# Patient Record
Sex: FEMALE | Race: BLACK OR AFRICAN AMERICAN | URBAN - METROPOLITAN AREA
[De-identification: names, ages, dates, MRNs, and addresses within clinical notes are randomized per-mention and may not be internally consistent; named-entity substitution may affect disease eponyms.]

---

## 2017-06-14 ENCOUNTER — OFFICE VISIT (OUTPATIENT)
Dept: NEUROLOGY | Age: 82
End: 2017-06-14

## 2017-06-14 VITALS
DIASTOLIC BLOOD PRESSURE: 66 MMHG | SYSTOLIC BLOOD PRESSURE: 146 MMHG | WEIGHT: 124 LBS | RESPIRATION RATE: 20 BRPM | OXYGEN SATURATION: 95 % | HEART RATE: 67 BPM | BODY MASS INDEX: 24.35 KG/M2 | HEIGHT: 60 IN

## 2017-06-14 DIAGNOSIS — G20 PARKINSON'S DISEASE (HCC): Primary | ICD-10-CM

## 2017-06-14 NOTE — PROGRESS NOTES
Parkinson's disease- she has been fine since she was here last, has been about the same except her feet are swelling more   Nothing new to report

## 2017-06-14 NOTE — PROGRESS NOTES
Date:  17     Name:  Sulaiman Najera  :  1925  MRN:  [id number]     PCP:  Elvie Pelayo MD    Chief Complaint   Patient presents with    Parkinsons Disease     HISTORY OF PRESENT ILLNESS: Follow up evaluation for Parkinson disease. Continues to take Sinemet 25/100 three times a day. She does continue to have some tremor but this is not significant nor does not get in her way. It is primarily a rest tremor. She does have some mild balance issues but has not had any falls. She denies having any issues with wearing off or freezing. No hallucinations or delusions. No difficulty with sleeping, depression, or anxiety. No significant rigidity. She does state that she has some slowness but this is probably no worse than any other 80year-old. She denies having any significant memory issues. No difficulty swallowing. Still independent with ADL    Except as noted above, denies  fever, chills, cough. No CP or SOB. No dysuria, loss of bowel or bladder control. No Weight loss. Appetite good. Sleeping well. No sweats. No edema. No bruising or bleeding. No nausea or vomit. No diarrhea. No frequency, urgency, No depressive sxs. No anxiety. Denies sore throat, nasal congestion, nasal discharge, epistaxis, tinnitus, hearing loss, back pain, muscle pain, or joint pain.        Current Outpatient Prescriptions   Medication Sig    ketorolac (ACULAR) 0.5 % ophthalmic solution INSTILL 1 DROP 4 TIMES A DAY INTO EYE HAVING SURGERY, START 2 DAYS PRIOR TO SURGERY    ofloxacin (FLOXIN) 0.3 % ophthalmic solution INSTILL 1 DROP 4 TIMES EVERY DAY INTO SURGERY EYE, START 2 DAYS PRIOR TO SURGERY    prednisoLONE acetate (PRED FORTE) 1 % ophthalmic suspension INSTILL 1 DROP 4 TIMES A DAY INTO EYE AFTER SURGERY FOR 1 WEEK THEN TAPER AS DIRECTED    carbidopa-levodopa (SINEMET)  mg per tablet TAKE 1 TABLET BY MOUTH 3 TIMES A DAY 10AM,2PM AND 6PM    POTASSIUM CHLORIDE (KLOR-CON PO) Take  by mouth.    TURMERIC ROOT EXTRACT PO Take  by mouth.  hydrochlorothiazide (HYDRODIURIL) 25 mg tablet Take 25 mg by mouth daily.  enalapril (VASOTEC) 5 mg tablet Take  by mouth daily. No current facility-administered medications for this visit. Allergies   Allergen Reactions    Shellfish Derived Nausea and Vomiting    Sulfa (Sulfonamide Antibiotics) Nausea and Vomiting     Past Medical History:   Diagnosis Date    Bell's palsy 1997    Diabetes ([address]     Heart disease     mumur    High blood pressure     Paralysis agitans (HCC)      Past Surgical History:   Procedure Laterality Date    ABDOMEN SURGERY PROC UNLISTED      carterized colon due to bleed    HX OTHER SURGICAL  Nov and Dec 2016    cataract removed in both eyes      Social History     Social History    Marital status:      Spouse name: N/A    Number of children: N/A    Years of education: N/A     Occupational History    Not on file. Social History Main Topics    Smoking status: Never Smoker    Smokeless tobacco: Never Used    Alcohol use No    Drug use: Not on file    Sexual activity: Not on file     Other Topics Concern    Not on file     Social History Narrative     Family History   Problem Relation Age of Onset    Parkinsonism Sister     Parkinsonism Brother        PHYSICAL EXAMINATION:    Visit Vitals    /66    Pulse 67    Resp 20    Ht 5' (1.524 m)    Wt 56.2 kg (124 lb)    SpO2 95%    BMI 24.22 kg/m2     General: Well defined, nourished, and groomed individual in no acute distress.    Neck: Supple, nontender, no bruits, no pain with resistance to active range of motion.    Heart: Regular rate and rhythm, positive heart murmurs, no rub or gallop. Normal S1S2.    Lungs: Clear to auscultation bilaterally with equal chest expansion, no cough, no wheeze    Musculoskeletal: Extremities revealed no edema and had full range of motion of joints.    Psych: Pleasant and engaging with a mask like face.    NEUROLOGICAL EXAMINATION:    Mental Status: Alert and oriented to person, place, and time with recent and remote memory intact. Attention span and concentration are normal. Speech is fluent with a full fund of knowledge.    Cranial Nerves:    II, III, IV, VI: Visual acuity grossly intact. Visual fields are normal.    Pupils are equal, round, and reactive to light and accommodation.    Extra-ocular movements are full and fluid. Fundoscopic exam was benign, no ptosis or nystagmus.    V-XII: Hearing is grossly intact. There is left facial asymmetry secondary to Bell's Palsy. The palate rises symmetrically and the tongue protrudes midline. Sternocleidomastoids 5/5.    Motor Examination: Age related loss of tone and bulk. Strength testing with minimal generalized weakness. Minimal cogwheel rigidity of the left wrist    Coordination: Finger to nose and rapid arm movement testing demonstrate bradykinesia and mild dysmetria. No intention tremor. Left rest tremor and chin tremor both of which are better than on previous exam    Gait and Station: Mildly unsteady with a slow gait and decreased arm swing. Attenuation of the left hand rest tremor. No pronator drift. No muscle wasting or fasiculations noted.    Reflexes: DTRs 2+ throughout. ASSESSMENT AND PLAN    ICD-10-CM ICD-9-CM    1. Parkinson's disease ([facility] G20 332.0      Parkinson's disease is stable. Symptoms are not significantly bothersome. Will continue with present dose of Sinemet 3 times daily. Follow-up in 6 months or sooner if needed. Danielle Díaz

## 2017-06-14 NOTE — PATIENT INSTRUCTIONS

## 2017-06-14 NOTE — MR AVS SNAPSHOT
Visit Information Date & Time Provider Department Dept. Phone Encounter #  
 6/14/2017 11:30 AM Joshua Tuttle NP [facility] Neurology [facility] [phone] [id number] Follow-up Instructions Return in about 6 months (around 12/14/2017). Upcoming Health Maintenance Date Due DTaP/Tdap/Td series (1 - Tdap) 8/28/1946 ZOSTER VACCINE AGE 60> 8/28/1985 GLAUCOMA SCREENING Q2Y 8/28/1990 OSTEOPOROSIS SCREENING (DEXA) 8/28/1990 Pneumococcal 65+ Low/Medium Risk (1 of 2 - PCV13) 8/28/1990 MEDICARE YEARLY EXAM 8/28/1990 INFLUENZA AGE 9 TO ADULT 8/1/2017 Allergies as of 6/14/2017  Review Complete On: 6/14/2017 By: Joshua Tuttle NP Severity Noted Reaction Type Reactions Shellfish Derived  12/02/2015   Systemic Nausea and Vomiting  
 Sulfa (Sulfonamide Antibiotics)  01/15/2015    Nausea and Vomiting Current Immunizations  Never Reviewed No immunizations on file. Not reviewed this visit You Were Diagnosed With   
  
 Codes Comments [facility]'s [facility] ([facility]    -  Primary ICD-10-CM: G20 
ICD-9-CM: 332.0 Vitals BP Pulse Resp Height(growth percentile) Weight(growth percentile) SpO2  
 146/66 67 20 5' (1.524 m) 124 lb (56.2 kg) 95% BMI OB Status Smoking Status 24.22 kg/m2 Postmenopausal Never Smoker Vitals History BMI and BSA Data Body Mass Index Body Surface Area  
 24.22 kg/m 2 1.54 m 2 Preferred Pharmacy Pharmacy Name Phone [id number] - [place], [place] - [address] [phone] Your Updated Medication List  
  
   
This list is accurate as of: 6/14/17 11:51 AM.  Always use your most recent med list.  
  
  
  
  
 carbidopa-levodopa  mg per tablet Commonly known as:  SINEMET  
TAKE 1 TABLET BY MOUTH 3 TIMES A DAY 10AM,2PM AND 6PM  
  
 enalapril 5 mg tablet Commonly known as:  Angela Sanchez Take  by mouth daily. hydroCHLOROthiazide 25 mg tablet Commonly known as:  HYDRODIURIL Take 25 mg by mouth daily. ketorolac 0.5 % ophthalmic solution Commonly known as:  Shade Saucedo INSTILL 1 DROP 4 TIMES A DAY INTO EYE HAVING SURGERY, START 2 DAYS PRIOR TO SURGERY  
  
 KLOR-CON PO Take  by mouth. ofloxacin 0.3 % ophthalmic solution Commonly known as:  FLOXIN  
INSTILL 1 DROP 4 TIMES EVERY DAY INTO SURGERY EYE, START 2 DAYS PRIOR TO SURGERY  
  
 prednisoLONE acetate 1 % ophthalmic suspension Commonly known as:  PRED FORTE INSTILL 1 DROP 4 TIMES A DAY INTO EYE AFTER SURGERY FOR 1 WEEK THEN TAPER AS DIRECTED  
  
 TURMERIC ROOT EXTRACT PO Take  by mouth. Follow-up Instructions Return in about 6 months (around 12/14/2017). Patient Instructions A Healthy Lifestyle: Care Instructions Your Care Instructions A healthy lifestyle can help you feel good, stay at a healthy weight, and have plenty of energy for both work and play. A healthy lifestyle is something you can share with your whole family. A healthy lifestyle also can lower your risk for serious health problems, such as high blood pressure, heart disease, and diabetes. You can follow a few steps listed below to improve your health and the health of your family. Follow-up care is a key part of your treatment and safety. Be sure to make and go to all appointments, and call your doctor if you are having problems. Its also a good idea to know your test results and keep a list of the medicines you take. How can you care for yourself at home? · Do not eat too much sugar, fat, or fast foods. You can still have dessert and treats now and then. The goal is moderation. · Start small to improve your eating habits. Pay attention to portion sizes, drink less juice and soda pop, and eat more fruits and vegetables. ¨ Eat a healthy amount of food.  A 3-ounce serving of meat, for example, is about the size of a deck of cards. Fill the rest of your plate with vegetables and whole grains. ¨ Limit the amount of soda and sports drinks you have every day. Drink more water when you are thirsty. ¨ Eat at least 5 servings of fruits and vegetables every day. It may seem like a lot, but it is not hard to reach this goal. A serving or helping is 1 piece of fruit, 1 cup of vegetables, or 2 cups of leafy, raw vegetables. Have an apple or some carrot sticks as an afternoon snack instead of a candy bar. Try to have fruits and/or vegetables at every meal. 
· Make exercise part of your daily routine. You may want to start with simple activities, such as walking, bicycling, or slow swimming. Try to be active 30 to 60 minutes every day. You do not need to do all 30 to 60 minutes all at once. For example, you can exercise 3 times a day for 10 or 20 minutes. Moderate exercise is safe for most people, but it is always a good idea to talk to your doctor before starting an exercise program. 
· Keep moving. Gabriela Gant the lawn, work in the garden, or [employer]. Take the stairs instead of the elevator at work. · If you smoke, quit. People who smoke have an increased risk for heart attack, stroke, cancer, and other lung illnesses. Quitting is hard, but there are ways to boost your chance of quitting tobacco for good. ¨ Use nicotine gum, patches, or lozenges. ¨ Ask your doctor about stop-smoking programs and medicines. ¨ Keep trying. In addition to reducing your risk of diseases in the future, you will notice some benefits soon after you stop using tobacco. If you have shortness of breath or asthma symptoms, they will likely get better within a few weeks after you quit. · Limit how much alcohol you drink. Moderate amounts of alcohol (up to 2 drinks a day for men, 1 drink a day for women) are okay. But drinking too much can lead to liver problems, high blood pressure, and other health problems. Family health If you have a family, there are many things you can do together to improve your health. · Eat meals together as a family as often as possible. · Eat healthy foods. This includes fruits, vegetables, lean meats and dairy, and whole grains. · Include your family in your fitness plan. Most people think of activities such as jogging or tennis as the way to fitness, but there are many ways you and your family can be more active. Anything that makes you breathe hard and gets your heart pumping is exercise. Here are some tips: 
¨ Walk to do errands or to take your child to school or the bus. ¨ Go for a family bike ride after dinner instead of watching TV. Where can you learn more? Go to http://irma-asya.info/. Enter J028 in the search box to learn more about \"A Healthy Lifestyle: Care Instructions. \" Current as of: July 26, 2016 Content Version: 11.2 © 5284-3018 [employer]. Care instructions adapted under license by [employer] (which disclaims liability or warranty for this information). If you have questions about a medical condition or this instruction, always ask your healthcare professional. [place] [address] any warranty or liability for your use of this information. Introducing \[facility] [place] [facility]\"" & HEALTH SERVICES! Martine Young introduces [employer] patient portal. Now you can access parts of your medical record, email your doctor's office, and request medication refills online. 1. In your internet browser, go to [email or web address] 2. Click on the First Time User? Click Here link in the Sign In box. You will see the New Member Sign Up page. 3. Enter your [employer] Access Code exactly as it appears below. You will not need to use this code after youve completed the sign-up process. If you do not sign up before the expiration date, you must request a new code. · [employer] Access Code: [email or web address] Expires: 9/12/2017 11:51 AM 
 
 4. Enter the last four digits of your Social Security Number (xxxx) and Date of Birth (mm/dd/yyyy) as indicated and click Submit. You will be taken to the next sign-up page. 5. Create a [employer] ID. This will be your [employer] login ID and cannot be changed, so think of one that is secure and easy to remember. 6. Create a [employer] password. You can change your password at any time. 7. Enter your Password Reset Question and Answer. This can be used at a later time if you forget your password. 8. Enter your e-mail address. You will receive e-mail notification when new information is available in [address] 9. Click Sign Up. You can now view and download portions of your medical record. 10. Click the Download Summary menu link to download a portable copy of your medical information. If you have questions, please visit the Frequently Asked Questions section of the [employer] website. Remember, [employer] is NOT to be used for urgent needs. For medical emergencies, dial 911. Now available from your iPhone and Android! Please provide this summary of care documentation to your next provider. Your primary care clinician is listed as Bel Villatoro. If you have any questions after today's visit, please call [phone].

## 2017-06-15 DIAGNOSIS — G20 PARKINSON'S DISEASE (HCC): ICD-10-CM

## 2017-06-16 RX ORDER — CARBIDOPA AND LEVODOPA 25; 100 MG/1; MG/1
TABLET ORAL
Qty: 90 TAB | Refills: 3 | Status: SHIPPED | OUTPATIENT
Start: 2017-06-16 | End: 2017-11-06 | Stop reason: SDUPTHER

## 2017-11-06 DIAGNOSIS — G20 PARKINSON'S DISEASE (HCC): ICD-10-CM

## 2017-11-06 RX ORDER — CARBIDOPA AND LEVODOPA 25; 100 MG/1; MG/1
TABLET ORAL
Qty: 90 TAB | Refills: 3 | Status: SHIPPED | OUTPATIENT
Start: 2017-11-06 | End: 2018-01-10 | Stop reason: SDUPTHER

## 2018-01-10 ENCOUNTER — OFFICE VISIT (OUTPATIENT)
Dept: NEUROLOGY | Age: 83
End: 2018-01-10

## 2018-01-10 VITALS
BODY MASS INDEX: 25.13 KG/M2 | WEIGHT: 128 LBS | RESPIRATION RATE: 20 BRPM | DIASTOLIC BLOOD PRESSURE: 80 MMHG | OXYGEN SATURATION: 98 % | HEART RATE: 86 BPM | SYSTOLIC BLOOD PRESSURE: 154 MMHG | HEIGHT: 60 IN

## 2018-01-10 DIAGNOSIS — G20 PARKINSON'S DISEASE (HCC): Primary | ICD-10-CM

## 2018-01-10 RX ORDER — CARBIDOPA AND LEVODOPA 25; 100 MG/1; MG/1
TABLET ORAL
Qty: 90 TAB | Refills: 3 | Status: SHIPPED | OUTPATIENT
Start: 2018-01-10 | End: 2018-07-11 | Stop reason: SDUPTHER

## 2018-01-10 NOTE — PATIENT INSTRUCTIONS

## 2018-01-10 NOTE — PROGRESS NOTES
Date:  01/10/18     Name:  Christiano Lr  :  1925  MRN:  [id number]     PCP:  Paulette Schaffer MD    Chief Complaint   Patient presents with    Tremors     parkinson's disease     HISTORY OF PRESENT ILLNESS: Follow up for Parkinson's Disease. She is accompanied by her daughter. She continues to take Sinemet three times a day. Continue with left hand tremor but not bothersome and not interfering with daily activities. Still has some slowness of movement which is really no different than it has been. She did have one fall in the house a couple of months ago because tripped over an extension cord. No significant stiffness or rigidity. Denies having any delusion, hallucinations, anxiety, depression. No issues with swallowing, sleeping. No changes in memory. Denies changes in bowel or bladder. Appetite is good. Independent with ADL. No freezing or wearing off. No changes in hearing or vision. Current Outpatient Prescriptions   Medication Sig    carbidopa-levodopa (SINEMET)  mg per tablet TAKE ONE TABLET BY MOUTH 3 TIMES A DAY (10AM, 2PM, AND 6PM)    ketorolac (ACULAR) 0.5 % ophthalmic solution INSTILL 1 DROP 4 TIMES A DAY INTO EYE HAVING SURGERY, START 2 DAYS PRIOR TO SURGERY    ofloxacin (FLOXIN) 0.3 % ophthalmic solution INSTILL 1 DROP 4 TIMES EVERY DAY INTO SURGERY EYE, START 2 DAYS PRIOR TO SURGERY    prednisoLONE acetate (PRED FORTE) 1 % ophthalmic suspension INSTILL 1 DROP 4 TIMES A DAY INTO EYE AFTER SURGERY FOR 1 WEEK THEN TAPER AS DIRECTED    POTASSIUM CHLORIDE (KLOR-CON PO) Take  by mouth.  TURMERIC ROOT EXTRACT PO Take  by mouth.  hydrochlorothiazide (HYDRODIURIL) 25 mg tablet Take 25 mg by mouth daily.  enalapril (VASOTEC) 5 mg tablet Take  by mouth daily. No current facility-administered medications for this visit.       Allergies   Allergen Reactions    Shellfish Derived Nausea and Vomiting    Sulfa (Sulfonamide Antibiotics) Nausea and Vomiting     Past Medical History:   Diagnosis Date    Bell's palsy 1997    [address] [facility] [address]     Heart disease     mumur    High blood pressure     Paralysis agitans ([facility]      Past Surgical History:   Procedure Laterality Date    ABDOMEN SURGERY PROC UNLISTED      carterized colon due to bleed    HX OTHER SURGICAL  Nov and Dec 2016    cataract removed in both eyes      Social History     Social History    Marital status:      Spouse name: N/A    Number of children: N/A    Years of education: N/A     Occupational History    Not on file. Social History Main Topics    Smoking status: Never Smoker    Smokeless tobacco: Never Used    Alcohol use No    Drug use: Not on file    Sexual activity: Not on file     Other Topics Concern    Not on file     Social History Narrative     Family History   Problem Relation Age of Onset    Parkinsonism Sister     Parkinsonism Brother        PHYSICAL EXAMINATION:    Visit Vitals    /80    Pulse 86    Resp 20    Ht 5' (1.524 m)    Wt 58.1 kg (128 lb)    SpO2 98%    BMI 25 kg/m2     General: Well defined, nourished, and groomed individual in no acute distress.    Neck: Supple, nontender, no bruits, no pain with resistance to active range of motion.    Heart: Regular rate and rhythm, positive heart murmurs, no rub or gallop. Normal S1S2.    Lungs: Clear to auscultation bilaterally with equal chest expansion, no cough, no wheeze    Musculoskeletal: Extremities revealed had full range of motion of joints. There is some mild edema in the lower extremities bilaterally, left greater than right  Psych: Pleasant and engaging with a mask like face.    NEUROLOGICAL EXAMINATION:    Mental Status: Alert and oriented to person, place, and time with recent and remote memory intact. Attention span and concentration are normal. Speech is fluent with a full fund of knowledge.    Cranial Nerves:    II, III, IV, VI: Visual acuity grossly intact.  Visual fields are normal.    Pupils are equal, round, and reactive to light and accommodation.    Extra-ocular movements are full and fluid. Fundoscopic exam was benign, no ptosis or nystagmus.    V-XII: Hearing is grossly intact. There is left facial asymmetry secondary to Bell's Palsy. The palate rises symmetrically and the tongue protrudes midline. Sternocleidomastoids 5/5.    Motor Examination: Age related loss of tone and bulk. Strength testing with minimal generalized weakness. Minimal cogwheel rigidity of the left wrist    Coordination: Finger to nose and rapid arm movement testing demonstrate bradykinesia and mild dysmetria. No intention tremor. Left rest tremor, no chin tremor    Gait and Station: Mildly unsteady with a slow gait and decreased arm swing. Uses a cane for balance. Attenuation of the left hand rest tremor. No pronator drift. No muscle wasting or fasiculations noted.    Reflexes: DTRs 2+ throughout.      ASSESSMENT AND PLAN    ICD-10-CM ICD-9-CM    1. [facility] G20 332.0 carbidopa-levodopa (SINEMET)  mg per tablet     Stable Parkinson Disease on present dose of Sinemet. Continue with the same tid. Discussed non-motor symptoms of Parkinson's Disease today as they had questions about Northera because they had seen the commercials. Follow up in six months    [facility].  Alicia Grant

## 2018-01-10 NOTE — MR AVS SNAPSHOT
Visit Information Date & Time Provider Department Dept. Phone Encounter #  
 1/10/2018  9:30 AM RM [facility] [phone] [id number] Follow-up Instructions Return in about 6 months (around 7/10/2018). Upcoming Health Maintenance Date Due DTaP/Tdap/Td series (1 - Tdap) 8/28/1946 ZOSTER VACCINE AGE 60> 6/28/1985 GLAUCOMA SCREENING Q2Y 8/28/1990 OSTEOPOROSIS SCREENING (DEXA) 8/28/1990 Pneumococcal 65+ Low/Medium Risk (1 of 2 - PCV13) 8/28/1990 MEDICARE YEARLY EXAM 8/28/1990 Influenza Age 5 to Adult 8/1/2017 Allergies as of 1/10/2018  Review Complete On: 1/10/2018 By: Trupti Hogan NP Severity Noted Reaction Type Reactions Shellfish Derived  12/02/2015   Systemic Nausea and Vomiting  
 Sulfa (Sulfonamide Antibiotics)  01/15/2015    Nausea and Vomiting Current Immunizations  Never Reviewed No immunizations on file. Not reviewed this visit You Were Diagnosed With   
  
 Codes Comments Parkinson's disease ([facility]    -  Primary ICD-10-CM: G20 
ICD-9-CM: 332.0 Vitals BP Pulse Resp Height(growth percentile) Weight(growth percentile) SpO2  
 154/80 86 20 5' (1.524 m) 128 lb (58.1 kg) 98% BMI OB Status Smoking Status 25 kg/m2 Postmenopausal Never Smoker Vitals History BMI and BSA Data Body Mass Index Body Surface Area  
 25 kg/m 2 1.57 m 2 Preferred Pharmacy Pharmacy Name Phone [facility]/PHARMACY #[id number] - [place], [place] - [address] Yael Hyman [phone] Your Updated Medication List  
  
   
This list is accurate as of: 1/10/18  9:56 AM.  Always use your most recent med list.  
  
  
  
  
 carbidopa-levodopa  mg per tablet Commonly known as:  SINEMET  
TAKE ONE TABLET BY MOUTH 3 TIMES A DAY (10AM, 2PM, AND 6PM)  
  
 enalapril 5 mg tablet Commonly known as:  Alvester Gelacio Take  by mouth daily. hydroCHLOROthiazide 25 mg tablet Commonly known as:  HYDRODIURIL Take 25 mg by mouth daily. ketorolac 0.5 % ophthalmic solution Commonly known as:  [place] INSTILL 1 DROP 4 TIMES A DAY INTO EYE HAVING SURGERY, START 2 DAYS PRIOR TO SURGERY  
  
 KLOR-CON PO Take  by mouth. ofloxacin 0.3 % ophthalmic solution Commonly known as:  FLOXIN  
INSTILL 1 DROP 4 TIMES EVERY DAY INTO SURGERY EYE, START 2 DAYS PRIOR TO SURGERY  
  
 prednisoLONE acetate 1 % ophthalmic suspension Commonly known as:  PRED FORTE INSTILL 1 DROP 4 TIMES A DAY INTO EYE AFTER SURGERY FOR 1 WEEK THEN TAPER AS DIRECTED  
  
 TURMERIC ROOT EXTRACT PO Take  by mouth. Prescriptions Sent to Pharmacy Refills  
 carbidopa-levodopa (SINEMET)  mg per tablet 3 Sig: TAKE ONE TABLET BY MOUTH 3 TIMES A DAY (10AM, 2PM, AND 6PM) Class: Normal  
 Pharmacy: [facility]/pharmacy #[id number] - [place], [place] - [address] [address] [facility] Sori[facility]  #: [phone] Follow-up Instructions Return in about 6 months (around 7/10/2018). Patient Instructions A Healthy Lifestyle: Care Instructions Your Care Instructions A healthy lifestyle can help you feel good, stay at a healthy weight, and have plenty of energy for both work and play. A healthy lifestyle is something you can share with your whole family. A healthy lifestyle also can lower your risk for serious health problems, such as high blood pressure, heart disease, and diabetes. You can follow a few steps listed below to improve your health and the health of your family. Follow-up care is a key part of your treatment and safety. Be sure to make and go to all appointments, and call your doctor if you are having problems. It's also a good idea to know your test results and keep a list of the medicines you take. How can you care for yourself at home? · Do not eat too much sugar, fat, or fast foods.  You can still have dessert and treats now and then. The goal is moderation. · Start small to improve your eating habits. Pay attention to portion sizes, drink less juice and soda pop, and eat more fruits and vegetables. ¨ Eat a healthy amount of food. A 3-ounce serving of meat, for example, is about the size of a deck of cards. Fill the rest of your plate with vegetables and whole grains. ¨ Limit the amount of soda and sports drinks you have every day. Drink more water when you are thirsty. ¨ Eat at least 5 servings of fruits and vegetables every day. It may seem like a lot, but it is not hard to reach this goal. A serving or helping is 1 piece of fruit, 1 cup of vegetables, or 2 cups of leafy, raw vegetables. Have an apple or some carrot sticks as an afternoon snack instead of a candy bar. Try to have fruits and/or vegetables at every meal. 
· Make exercise part of your daily routine. You may want to start with simple activities, such as walking, bicycling, or slow swimming. Try to be active 30 to 60 minutes every day. You do not need to do all 30 to 60 minutes all at once. For example, you can exercise 3 times a day for 10 or 20 minutes. Moderate exercise is safe for most people, but it is always a good idea to talk to your doctor before starting an exercise program. 
· Keep moving. Vin Lopezagi the lawn, work in the garden, or [employer]. Take the stairs instead of the elevator at work. · If you smoke, quit. People who smoke have an increased risk for heart attack, stroke, cancer, and other lung illnesses. Quitting is hard, but there are ways to boost your chance of quitting tobacco for good. ¨ Use nicotine gum, patches, or lozenges. ¨ Ask your doctor about stop-smoking programs and medicines. ¨ Keep trying.  
In addition to reducing your risk of diseases in the future, you will notice some benefits soon after you stop using tobacco. If you have shortness of breath or asthma symptoms, they will likely get better within a few weeks after you quit. · Limit how much alcohol you drink. Moderate amounts of alcohol (up to 2 drinks a day for men, 1 drink a day for women) are okay. But drinking too much can lead to liver problems, high blood pressure, and other health problems. Family health If you have a family, there are many things you can do together to improve your health. · Eat meals together as a family as often as possible. · Eat healthy foods. This includes fruits, vegetables, lean meats and dairy, and whole grains. · Include your family in your fitness plan. Most people think of activities such as jogging or tennis as the way to fitness, but there are many ways you and your family can be more active. Anything that makes you breathe hard and gets your heart pumping is exercise. Here are some tips: 
¨ Walk to do errands or to take your child to school or the bus. ¨ Go for a family bike ride after dinner instead of watching TV. Where can you learn more? Go to http://irma-asya.info/. Enter T654 in the search box to learn more about \"A Healthy Lifestyle: Care Instructions. \" Current as of: May 12, 2017 Content Version: 11.4 © 6760-6549 [employer]. Care instructions adapted under license by [employer] (which disclaims liability or warranty for this information). If you have questions about a medical condition or this instruction, always ask your healthcare professional. [place] [address] any warranty or liability for your use of this information. Introducing [facility]! Hallie Rodriguez introduces [employer] patient portal. Now you can access parts of your medical record, email your doctor's office, and request medication refills online. 1. In your internet browser, go to [email or web address] 2. Click on the First Time User? Click Here link in the Sign In box. You will see the New Member Sign Up page. 3. Enter your [employer] Access Code exactly as it appears below. You will not need to use this code after youve completed the sign-up process. If you do not sign up before the expiration date, you must request a new code. · [employer] Access Code: [email or web address] Expires: 4/10/2018  9:56 AM 
 
4. Enter the last four digits of your Social Security Number (xxxx) and Date of Birth (mm/dd/yyyy) as indicated and click Submit. You will be taken to the next sign-up page. 5. Create a [employer] ID. This will be your [employer] login ID and cannot be changed, so think of one that is secure and easy to remember. 6. Create a [employer] password. You can change your password at any time. 7. Enter your Password Reset Question and Answer. This can be used at a later time if you forget your password. 8. Enter your e-mail address. You will receive e-mail notification when new information is available in [address]. 9. Click Sign Up. You can now view and download portions of your medical record. 10. Click the Download Summary menu link to download a portable copy of your medical information. If you have questions, please visit the Frequently Asked Questions section of the [employer] website. Remember, [employer] is NOT to be used for urgent needs. For medical emergencies, dial 911. Now available from your iPhone and Android! Please provide this summary of care documentation to your next provider. Your primary care clinician is listed as Briana Lewis. If you have any questions after today's visit, please call [phone].

## 2018-07-11 ENCOUNTER — OFFICE VISIT (OUTPATIENT)
Dept: NEUROLOGY | Age: 83
End: 2018-07-11

## 2018-07-11 VITALS
RESPIRATION RATE: 20 BRPM | OXYGEN SATURATION: 96 % | HEIGHT: 60 IN | WEIGHT: 128 LBS | DIASTOLIC BLOOD PRESSURE: 60 MMHG | HEART RATE: 71 BPM | BODY MASS INDEX: 25.13 KG/M2 | SYSTOLIC BLOOD PRESSURE: 110 MMHG

## 2018-07-11 DIAGNOSIS — G20 PARKINSON'S DISEASE (HCC): Primary | ICD-10-CM

## 2018-07-11 RX ORDER — LISINOPRIL 20 MG/1
TABLET ORAL
COMMUNITY
Start: 2018-07-05 | End: 2020-01-28

## 2018-07-11 RX ORDER — CARBIDOPA AND LEVODOPA 25; 100 MG/1; MG/1
TABLET ORAL
Qty: 90 TAB | Refills: 3 | Status: SHIPPED | OUTPATIENT
Start: 2018-07-11 | End: 2019-01-09 | Stop reason: SDUPTHER

## 2018-07-11 RX ORDER — RIVAROXABAN 15 MG/1
TABLET, FILM COATED ORAL
Refills: 6 | COMMUNITY
Start: 2018-06-20 | End: 2019-01-29 | Stop reason: ALTCHOICE

## 2018-07-11 RX ORDER — FUROSEMIDE 20 MG/1
TABLET ORAL
Refills: 1 | COMMUNITY
Start: 2018-06-13 | End: 2020-01-28

## 2018-07-11 RX ORDER — POTASSIUM CHLORIDE 1500 MG/1
TABLET, EXTENDED RELEASE ORAL
Refills: 3 | COMMUNITY
Start: 2018-06-16 | End: 2020-01-28

## 2018-07-11 NOTE — PATIENT INSTRUCTIONS

## 2018-07-11 NOTE — PROGRESS NOTES
Had a pacemaker placed in March after she was hospitalized at [address] [place] [address] Dr. Noble Hodges is her physician she sees there   Parkinson's disease- she has been freezing up more but she hasn't been taking the medication very consistant during the day , it seems like it is always at a different time according to her daughter   She does use her cane all the time

## 2018-07-11 NOTE — PROGRESS NOTES
Date:  18     Name:  Jil Donovan  :  1925  MRN:  [id number]     PCP:  Mela Max MD    Chief Complaint   Patient presents with    Parkinsons Disease     HISTORY OF PRESENT ILLNESS: Follow up appointment for Parkinson disease. Symptoms:   Tremors/Shaking-still primarily left handed rest, it is not bothersome  Muscle stiffness (rigidity)-no   Problems with balance, shuffling walk, falls-some balance problems and did have some issues with teetering until she could sit down  Slowness (bradykinesia)  Freezing or wearing off, off time-she is getting some freezing and wearing off but this seems to be related to not taking the Sinemet consistently  Soft voice, issues swallowing-no  Sudden, erratic movements (Dyskinesias)-no  Memory loss, difficulty thinking or remembering clearly-she says it is good, daughter indicates that the memory is okay. She seems to remember what is important to her  Depression/anxiety-no  Difficulty sleeping, issues with talking in sleep or acting out dreams-no issues with sleeping but daughter indicates that she has talked in her sleep periodically  Constipation-no  Hallucinations/delusions-no  Nausea-no  Lightheadedness, positional dizziness-no  Compulsive behaviors/urges-no  Morning Eligio Sean    Activity of daily living:  Does not cook or drive  Eating- no issues. Sometimes if she is drinking from a bottle she might choke a little but no issues with drinking out of glass  Getting dressed, bathing, shaving, etc.-no  Housework/yardwork-daughter does this, but she likes to supervise  Rising from the bed or chair-she uses a lifting chair to help her stand. However, she does not really need it.       Current Outpatient Prescriptions   Medication Sig    lisinopril (PRINIVIL, ZESTRIL) 20 mg tablet     XARELTO 15 mg tab tablet TAKE 1 TABLET BY MOUTH WITH FOOD DAILY    KLOR-CON M20 20 mEq tablet TAKE 1 TABLET BY MOUTH EVERY DAY WITH FOOD    furosemide (LASIX) 20 mg tablet TAKE 1 TABLET BY MOUTH DAILY    carboxymethylcellulose sodium (REFRESH OP) Apply  to eye.  carbidopa-levodopa (SINEMET)  mg per tablet TAKE ONE TABLET BY MOUTH 3 TIMES A DAY (10AM, 2PM, AND 6PM)    TURMERIC ROOT EXTRACT PO Take  by mouth.  hydrochlorothiazide (HYDRODIURIL) 25 mg tablet Take 25 mg by mouth daily. No current facility-administered medications for this visit. Allergies   Allergen Reactions    Shellfish Derived Nausea and Vomiting    Sulfa (Sulfonamide Antibiotics) Nausea and Vomiting     Past Medical History:   Diagnosis Date    Bell's palsy 1997    Diabetes ([facility]     Heart disease     mumur    High blood pressure     Paralysis agitans ([facility]      Past Surgical History:   Procedure Laterality Date    ABDOMEN SURGERY PROC UNLISTED      carterized colon due to bleed    HX OTHER SURGICAL  Nov and Dec 2016    cataract removed in both eyes     HX PACEMAKER PLACEMENT  03/2018     Social History     Social History    Marital status:      Spouse name: N/A    Number of children: N/A    Years of education: N/A     Occupational History    Not on file. Social History Main Topics    Smoking status: Never Smoker    Smokeless tobacco: Never Used    Alcohol use No    Drug use: Not on file    Sexual activity: Not on file     Other Topics Concern    Not on file     Social History Narrative     Family History   Problem Relation Age of Onset    Parkinsonism Sister     Parkinsonism Brother        PHYSICAL EXAMINATION:    Visit Vitals    /60    Pulse 71    Resp 20    Ht 5' (1.524 m)    Wt 58.1 kg (128 lb)    SpO2 96%    BMI 25 kg/m2     General: Well defined, nourished, and groomed individual in no acute distress.    Neck: Supple, nontender, no bruits, no pain with resistance to active range of motion.    Heart: Regular rate and rhythm, positive heart murmurs, no rub or gallop.  Normal S1S2.    Lungs: Clear to auscultation bilaterally with equal chest expansion, no cough, no wheeze    Musculoskeletal: Extremities revealed had full range of motion of joints. There is some mild edema in the lower extremities bilaterally, left greater than right  Psych: Pleasant and engaging with a mask like face.    NEUROLOGICAL EXAMINATION:    Mental Status: Alert and oriented to person, place, and time with recent and remote memory intact. Attention span and concentration are normal. Speech is fluent with a full fund of knowledge.    Cranial Nerves:    II, III, IV, VI: Visual acuity grossly intact. Visual fields are normal.    Pupils are equal, round, and reactive to light and accommodation.    Extra-ocular movements are full and fluid. Fundoscopic exam was benign, no ptosis or nystagmus.    V-XII: Hearing is grossly intact. There is left facial asymmetry secondary to Bell's Palsy. The palate rises symmetrically and the tongue protrudes midline. Sternocleidomastoids 5/5.    Motor Examination: Age related loss of tone and bulk. Strength testing with minimal generalized weakness. Minimal cogwheel rigidity of the left wrist    Coordination: Finger to nose and rapid arm movement testing demonstrate bradykinesia and mild dysmetria. No intention tremor. Left rest tremor, no chin tremor    Gait and Station: Mildly unsteady with a slow gait and decreased arm swing. Uses a cane for balance. Attenuation of the left hand rest tremor. No pronator drift. No muscle wasting or fasiculations noted.    Reflexes: DTRs 2+ throughout. ASSESSMENT AND PLAN    ICD-10-CM ICD-9-CM    1. [facility]'s [facility] ([facility] G20 332.0 carbidopa-levodopa (SINEMET)  mg per tablet     Stable Parkinson's Disease on present therapy of Sinemet 25/100 three times a day and only noticing the freezing and wearing off when she is not taking the medication correctly. Recommended she take the medication three times a day as directed. Follow up in six months or sooner if needed. Danielle Dewitt

## 2018-07-11 NOTE — MR AVS SNAPSHOT
[address] [facility]  [place] [facility] [id number] [phone] Patient: Simone Holley MRN: [id number] Visit Information Date & Time Provider Department Dept. Phone Encounter #  
 2018  9:30 AM Elizabeth Aguilar NP [facility] [phone] [id number] Follow-up Instructions Return in about 6 months (around 2019). Upcoming Health Maintenance Date Due DTaP/Tdap/Td series (1 - Tdap) 1946 ZOSTER VACCINE AGE 60> 1985 GLAUCOMA SCREENING Q2Y 1990 Bone Densitometry (Dexa) Screening 1990 Pneumococcal 65+ Low/Medium Risk (1 of 2 - PCV13) 1990 MEDICARE YEARLY EXAM 3/14/2018 Influenza Age 5 to Adult 2018 Allergies as of 2018  Review Complete On: 2018 By: Elizabeth Aguilar NP Severity Noted Reaction Type Reactions Shellfish Derived  2015   Systemic Nausea and Vomiting  
 Sulfa (Sulfonamide Antibiotics)  01/15/2015    Nausea and Vomiting Current Immunizations  Never Reviewed No immunizations on file. Not reviewed this visit You Were Diagnosed With   
  
 Codes Comments Parkinson's disease ([facility]    -  Primary ICD-10-CM: G20 
ICD-9-CM: 332.0 Vitals BP Pulse Resp Height(growth percentile) Weight(growth percentile) SpO2  
 110/60 71 20 5' (1.524 m) 128 lb (58.1 kg) 96% BMI OB Status Smoking Status 25 kg/m2 Postmenopausal Never Smoker Vitals History BMI and BSA Data Body Mass Index Body Surface Area  
 25 kg/m 2 1.57 m 2 Preferred Pharmacy Pharmacy Name Phone [facility]/PHARMACY #[id number] - [place], [place] - [address] [phone] Your Updated Medication List  
  
   
This list is accurate as of 18 10:31 AM.  Always use your most recent med list.  
  
  
  
  
 carbidopa-levodopa  mg per tablet Commonly known as:  SINEMET  
TAKE ONE TABLET BY MOUTH 3 TIMES A DAY (10AM, 2PM, AND 6PM) furosemide 20 mg tablet Commonly known as:  LASIX TAKE 1 TABLET BY MOUTH DAILY  
  
 hydroCHLOROthiazide 25 mg tablet Commonly known as:  HYDRODIURIL Take 25 mg by mouth daily. KLOR-CON M20 20 mEq tablet Generic drug:  potassium chloride TAKE 1 TABLET BY MOUTH EVERY DAY WITH FOOD  
  
 lisinopril 20 mg tablet Commonly known as:  Katerin Kinds OP Apply  to eye. TURMERIC ROOT EXTRACT PO Take  by mouth. XARELTO 15 mg Tab tablet Generic drug:  rivaroxaban TAKE 1 TABLET BY MOUTH WITH FOOD DAILY Prescriptions Sent to Pharmacy Refills  
 carbidopa-levodopa (SINEMET)  mg per tablet 3 Sig: TAKE ONE TABLET BY MOUTH 3 TIMES A DAY (10AM, 2PM, AND 6PM) Class: Normal  
 Pharmacy: [facility]/pharmacy #[id number] - [place], [place] - [address] [address] [facility] [address]  #: [phone] Follow-up Instructions Return in about 6 months (around 1/11/2019). Patient Instructions A Healthy Lifestyle: Care Instructions Your Care Instructions A healthy lifestyle can help you feel good, stay at a healthy weight, and have plenty of energy for both work and play. A healthy lifestyle is something you can share with your whole family. A healthy lifestyle also can lower your risk for serious health problems, such as high blood pressure, heart disease, and diabetes. You can follow a few steps listed below to improve your health and the health of your family. Follow-up care is a key part of your treatment and safety. Be sure to make and go to all appointments, and call your doctor if you are having problems. It's also a good idea to know your test results and keep a list of the medicines you take. How can you care for yourself at home? · Do not eat too much sugar, fat, or fast foods.  You can still have dessert and treats now and then. The goal is moderation. · Start small to improve your eating habits. Pay attention to portion sizes, drink less juice and soda pop, and eat more fruits and vegetables. ¨ Eat a healthy amount of food. A 3-ounce serving of meat, for example, is about the size of a deck of cards. Fill the rest of your plate with vegetables and whole grains. ¨ Limit the amount of soda and sports drinks you have every day. Drink more water when you are thirsty. ¨ Eat at least 5 servings of fruits and vegetables every day. It may seem like a lot, but it is not hard to reach this goal. A serving or helping is 1 piece of fruit, 1 cup of vegetables, or 2 cups of leafy, raw vegetables. Have an apple or some carrot sticks as an afternoon snack instead of a candy bar. Try to have fruits and/or vegetables at every meal. 
· Make exercise part of your daily routine. You may want to start with simple activities, such as walking, bicycling, or slow swimming. Try to be active 30 to 60 minutes every day. You do not need to do all 30 to 60 minutes all at once. For example, you can exercise 3 times a day for 10 or 20 minutes. Moderate exercise is safe for most people, but it is always a good idea to talk to your doctor before starting an exercise program. 
· Keep moving. Skyler Bun the lawn, work in the garden, or [employer]. Take the stairs instead of the elevator at work. · If you smoke, quit. People who smoke have an increased risk for heart attack, stroke, cancer, and other lung illnesses. Quitting is hard, but there are ways to boost your chance of quitting tobacco for good. ¨ Use nicotine gum, patches, or lozenges. ¨ Ask your doctor about stop-smoking programs and medicines. ¨ Keep trying.  
In addition to reducing your risk of diseases in the future, you will notice some benefits soon after you stop using tobacco. If you have shortness of breath or asthma symptoms, they will likely get better within a few weeks after you quit. · Limit how much alcohol you drink. Moderate amounts of alcohol (up to 2 drinks a day for men, 1 drink a day for women) are okay. But drinking too much can lead to liver problems, high blood pressure, and other health problems. Family health If you have a family, there are many things you can do together to improve your health. · Eat meals together as a family as often as possible. · Eat healthy foods. This includes fruits, vegetables, lean meats and dairy, and whole grains. · Include your family in your fitness plan. Most people think of activities such as jogging or tennis as the way to fitness, but there are many ways you and your family can be more active. Anything that makes you breathe hard and gets your heart pumping is exercise. Here are some tips: 
¨ Walk to do errands or to take your child to school or the bus. ¨ Go for a family bike ride after dinner instead of watching TV. Where can you learn more? Go to http://irma-asya.info/. Enter Y748 in the search box to learn more about \"A Healthy Lifestyle: Care Instructions. \" Current as of: December 7, 2017 Content Version: 11.7 © 3796-5724 [employer]. Care instructions adapted under license by [employer] (which disclaims liability or warranty for this information). If you have questions about a medical condition or this instruction, always ask your healthcare professional. [place] [address] any warranty or liability for your use of this information. Introducing [facility]! Maikel Balderas introduces Veracode patient portal. Now you can access parts of your medical record, email your doctor's office, and request medication refills online. 1. In your internet browser, go to [email or web address]. [email or web address] 2. Click on the First Time User? Click Here link in the Sign In box. You will see the New Member Sign Up page. 3. Enter your PFSweb Access Code exactly as it appears below. You will not need to use this code after youve completed the sign-up process. If you do not sign up before the expiration date, you must request a new code. · PFSweb Access Code: [email or web address] Expires: 10/9/2018 10:17 AM 
 
4. Enter the last four digits of your Social Security Number (xxxx) and Date of Birth (mm/dd/yyyy) as indicated and click Submit. You will be taken to the next sign-up page. 5. Create a PFSweb ID. This will be your PFSweb login ID and cannot be changed, so think of one that is secure and easy to remember. 6. Create a PFSweb password. You can change your password at any time. 7. Enter your Password Reset Question and Answer. This can be used at a later time if you forget your password. 8. Enter your e-mail address. You will receive e-mail notification when new information is available in [address]. 9. Click Sign Up. You can now view and download portions of your medical record. 10. Click the Download Summary menu link to download a portable copy of your medical information. If you have questions, please visit the Frequently Asked Questions section of the PFSweb website. Remember, PFSweb is NOT to be used for urgent needs. For medical emergencies, dial 911. Now available from your iPhone and Android! Please provide this summary of care documentation to your next provider. Your primary care clinician is listed as Jewel Race. If you have any questions after today's visit, please call [phone].

## 2019-01-09 DIAGNOSIS — G20 PARKINSON'S DISEASE (HCC): ICD-10-CM

## 2019-01-09 RX ORDER — CARBIDOPA AND LEVODOPA 25; 100 MG/1; MG/1
TABLET ORAL
Qty: 90 TAB | Refills: 3 | Status: SHIPPED | OUTPATIENT
Start: 2019-01-09 | End: 2019-01-29 | Stop reason: SDUPTHER

## 2019-01-29 ENCOUNTER — OFFICE VISIT (OUTPATIENT)
Dept: NEUROLOGY | Age: 84
End: 2019-01-29

## 2019-01-29 VITALS
OXYGEN SATURATION: 96 % | SYSTOLIC BLOOD PRESSURE: 112 MMHG | HEART RATE: 110 BPM | BODY MASS INDEX: 25 KG/M2 | HEIGHT: 60 IN | DIASTOLIC BLOOD PRESSURE: 60 MMHG | RESPIRATION RATE: 20 BRPM

## 2019-01-29 DIAGNOSIS — G20 PARKINSON'S DISEASE (HCC): ICD-10-CM

## 2019-01-29 RX ORDER — APIXABAN 2.5 MG/1
2.5 TABLET, FILM COATED ORAL 2 TIMES DAILY
COMMUNITY
Start: 2018-11-19

## 2019-01-29 RX ORDER — DOCUSATE SODIUM 100 MG/1
100 CAPSULE, LIQUID FILLED ORAL 2 TIMES DAILY
COMMUNITY

## 2019-01-29 RX ORDER — LANOLIN ALCOHOL/MO/W.PET/CERES
1000 CREAM (GRAM) TOPICAL DAILY
COMMUNITY
End: 2020-01-28

## 2019-01-29 RX ORDER — CARBIDOPA AND LEVODOPA 25; 100 MG/1; MG/1
TABLET ORAL
Qty: 90 TAB | Refills: 3 | Status: SHIPPED | OUTPATIENT
Start: 2019-01-29 | End: 2019-09-05 | Stop reason: SDUPTHER

## 2019-01-29 NOTE — PROGRESS NOTES
She is also using EHT- dietary supplement     Parkinson's disease- not taking her medication regularly on a daily basis   Not taking them consistently at the same time every day per the daughter

## 2019-01-29 NOTE — PATIENT INSTRUCTIONS
A Healthy Lifestyle: Care Instructions  Your Care Instructions    A healthy lifestyle can help you feel good, stay at a healthy weight, and have plenty of energy for both work and play. A healthy lifestyle is something you can share with your whole family. A healthy lifestyle also can lower your risk for serious health problems, such as high blood pressure, heart disease, and diabetes. You can follow a few steps listed below to improve your health and the health of your family. Follow-up care is a key part of your treatment and safety. Be sure to make and go to all appointments, and call your doctor if you are having problems. It's also a good idea to know your test results and keep a list of the medicines you take. How can you care for yourself at home? · Do not eat too much sugar, fat, or fast foods. You can still have dessert and treats now and then. The goal is moderation. · Start small to improve your eating habits. Pay attention to portion sizes, drink less juice and soda pop, and eat more fruits and vegetables. ? Eat a healthy amount of food. A 3-ounce serving of meat, for example, is about the size of a deck of cards. Fill the rest of your plate with vegetables and whole grains. ? Limit the amount of soda and sports drinks you have every day. Drink more water when you are thirsty. ? Eat at least 5 servings of fruits and vegetables every day. It may seem like a lot, but it is not hard to reach this goal. A serving or helping is 1 piece of fruit, 1 cup of vegetables, or 2 cups of leafy, raw vegetables. Have an apple or some carrot sticks as an afternoon snack instead of a candy bar. Try to have fruits and/or vegetables at every meal.  · Make exercise part of your daily routine. You may want to start with simple activities, such as walking, bicycling, or slow swimming. Try to be active 30 to 60 minutes every day. You do not need to do all 30 to 60 minutes all at once.  For example, you can exercise 3 times a day for 10 or 20 minutes. Moderate exercise is safe for most people, but it is always a good idea to talk to your doctor before starting an exercise program.  · Keep moving. Juanpablo Buddle the lawn, work in the garden, or [employer]. Take the stairs instead of the elevator at work. · If you smoke, quit. People who smoke have an increased risk for heart attack, stroke, cancer, and other lung illnesses. Quitting is hard, but there are ways to boost your chance of quitting tobacco for good. ? Use nicotine gum, patches, or lozenges. ? Ask your doctor about stop-smoking programs and medicines. ? Keep trying. In addition to reducing your risk of diseases in the future, you will notice some benefits soon after you stop using tobacco. If you have shortness of breath or asthma symptoms, they will likely get better within a few weeks after you quit. · Limit how much alcohol you drink. Moderate amounts of alcohol (up to 2 drinks a day for men, 1 drink a day for women) are okay. But drinking too much can lead to liver problems, high blood pressure, and other health problems. Family health  If you have a family, there are many things you can do together to improve your health. · Eat meals together as a family as often as possible. · Eat healthy foods. This includes fruits, vegetables, lean meats and dairy, and whole grains. · Include your family in your fitness plan. Most people think of activities such as jogging or tennis as the way to fitness, but there are many ways you and your family can be more active. Anything that makes you breathe hard and gets your heart pumping is exercise. Here are some tips:  ? Walk to do errands or to take your child to school or the bus.  ? Go for a family bike ride after dinner instead of watching TV. Where can you learn more? Go to http://irma-asya.info/. Enter R886 in the search box to learn more about \"A Healthy Lifestyle: Care Instructions. \"  Current as of: September 11, 2018  Content Version: 11.9  © 7574-6403 [employer]. Care instructions adapted under license by [employer] (which disclaims liability or warranty for this information). If you have questions about a medical condition or this instruction, always ask your healthcare professional. [address] any warranty or liability for your use of this information.

## 2019-01-29 NOTE — PROGRESS NOTES
Date:  19     Name:  Keiry Galarza  :  1925  MRN:  [id number]     PCP:  Brenda Strong MD    Chief Complaint   Patient presents with    Parkinsons Disease     HISTORY OF PRESENT ILLNESS: Follow up appointment for Parkinson disease. Symptoms:   Tremors/Shaking-still primarily left handed rest, it is not bothersome  Muscle stiffness (rigidity)-no   Problems with balance, shuffling walk, falls-some balance problems but she does not really move around very much. She sits and watches TV pretty much all day. Slowness (bradykinesia)  Freezing or wearing off, off time-she is getting some freezing and wearing off but this seems to be related to not taking the Sinemet consistently which is an ongoing challenge  Soft voice, issues swallowing-no  Sudden, erratic movements (Dyskinesias)-no  Memory loss, difficulty thinking or remembering clearly-she says it is good, daughter indicates that the memory is okay. She seems to remember what is important to her  Depression/anxiety-no  Difficulty sleeping, issues with talking in sleep or acting out dreams-no issues with sleeping but daughter indicates that she has talked in her sleep periodically  Constipation-yes  Hallucinations/delusions-no  Nausea-no  Lightheadedness, positional dizziness-no  Compulsive behaviors/urges-no  Morning Vicente Skains    Activity of daily living:  Does not cook or drive  Eating- no issues. Getting dressed, bathing, shaving, etc.-no  Housework/yardwork-daughter does this, but she likes to supervise  Rising from the bed or chair-she uses a lifting chair to help her stand. Current Outpatient Medications   Medication Sig    ELIQUIS 2.5 mg tablet     cyanocobalamin (VITAMIN B-12) 1,000 mcg tablet Take 1,000 mcg by mouth daily.  docusate sodium (COLACE) 100 mg capsule Take 100 mg by mouth two (2) times a day.  iron,carb/vit C/vit B12/folic (IRON 195 PLUS PO) Take  by mouth.     carbidopa-levodopa (SINEMET)  mg per tablet TAKE 1 TABLET 3 TIMES A DAY AT 10AM, 2PM AND 6PM    lisinopril (PRINIVIL, ZESTRIL) 20 mg tablet     KLOR-CON M20 20 mEq tablet TAKE 1 TABLET BY MOUTH EVERY DAY WITH FOOD    furosemide (LASIX) 20 mg tablet TAKE 1 TABLET BY MOUTH DAILY    carboxymethylcellulose sodium (REFRESH OP) Apply  to eye.  TURMERIC ROOT EXTRACT PO Take  by mouth.  hydrochlorothiazide (HYDRODIURIL) 25 mg tablet Take 12.5 mg by mouth daily. No current facility-administered medications for this visit.       Allergies   Allergen Reactions    Shellfish Derived Nausea and Vomiting    Sulfa (Sulfonamide Antibiotics) Nausea and Vomiting     Past Medical History:   Diagnosis Date    Bell's palsy 1997    Diabetes ([address]     Heart disease     mumur    High blood pressure     [facility] agitans ([facility])      Past Surgical History:   Procedure Laterality Date    ABDOMEN SURGERY PROC UNLISTED      carterized colon due to bleed    HX OTHER SURGICAL  Nov and Dec 2016    cataract removed in both eyes     HX PACEMAKER PLACEMENT  03/2018     Social History     Socioeconomic History    Marital status:      Spouse name: Not on file    Number of children: Not on file    Years of education: Not on file    Highest education level: Not on file   Social Needs    Financial resource strain: Not on file    Food insecurity - worry: Not on file    Food insecurity - inability: Not on file   [employer] needs - medical: Not on file   [employer] needs - non-medical: Not on file   Occupational History    Not on file   Tobacco Use    Smoking status: Never Smoker    Smokeless tobacco: Never Used   Substance and Sexual Activity    Alcohol use: No    Drug use: Not on file    Sexual activity: Not on file   Other Topics Concern    Not on file   Social History Narrative    Not on file     Family History   Problem Relation Age of Onset    Parkinsonism Sister     Parkinsonism Brother        PHYSICAL EXAMINATION: Visit Vitals  /60   Pulse (!) 110   Resp 20   Ht 5' (1.524 m)   SpO2 96%   BMI 25.00 kg/m²     General: Well defined, nourished, and groomed individual in no acute distress.    Neck: Supple, nontender, no bruits, no pain with resistance to active range of motion.    Heart: Regular rate and rhythm, positive heart murmurs, no rub or gallop. Normal S1S2.    Lungs: Clear to auscultation bilaterally with equal chest expansion, no cough, no wheeze    Musculoskeletal: Extremities revealed had full range of motion of joints. There is some mild edema in the lower extremities bilaterally, left greater than right  Psych: Pleasant and engaging with a mask like face.    NEUROLOGICAL EXAMINATION:    Mental Status: Alert and oriented to person, place, and time with recent and remote memory intact. Attention span and concentration are normal. Speech is fluent with a full fund of knowledge.    Cranial Nerves:    II, III, IV, VI: Visual acuity grossly intact. Visual fields are normal.    Pupils are equal, round, and reactive to light and accommodation.    Extra-ocular movements are full and fluid. Fundoscopic exam was benign, no ptosis or nystagmus.    V-XII: Hearing is grossly intact. There is left facial asymmetry secondary to Bell's Palsy. The palate rises symmetrically and the tongue protrudes midline. Sternocleidomastoids 5/5.    Motor Examination: Age related loss of tone and bulk. Strength testing with minimal generalized weakness. Minimal cogwheel rigidity of the left wrist    Coordination: Finger to nose and rapid arm movement testing demonstrate bradykinesia and mild dysmetria. No intention tremor. Left rest tremor, no chin tremor    Gait and Station: Mildly unsteady with a slow gait and decreased arm swing. Uses a cane for balance. Attenuation of the left hand rest tremor. No pronator drift. No muscle wasting or fasiculations noted.    Reflexes: DTRs 2+ throughout.      ASSESSMENT AND PLAN    ICD-10-CM ICD-9-CM 1. Parkinson's disease ([facility] G20 332.0 carbidopa-levodopa (SINEMET)  mg per tablet     Discussed medication and taking the Sinemet every four hours as prescribed. Also encouraged her to ensure that she gets up and walks around more. Recommended that she try  Getting up during the commercials to walk around. The movement will help her maintain her strength and balance as well as improve the dependent edema and improve constipation. Follow up in six months. Danielle Redding

## 2019-07-30 ENCOUNTER — OFFICE VISIT (OUTPATIENT)
Dept: NEUROLOGY | Age: 84
End: 2019-07-30

## 2019-07-30 ENCOUNTER — HOME HEALTH ADMISSION (OUTPATIENT)
Dept: HOME HEALTH SERVICES | Facility: HOME HEALTH | Age: 84
End: 2019-07-30

## 2019-07-30 VITALS
HEIGHT: 60 IN | OXYGEN SATURATION: 98 % | SYSTOLIC BLOOD PRESSURE: 144 MMHG | RESPIRATION RATE: 20 BRPM | DIASTOLIC BLOOD PRESSURE: 60 MMHG | BODY MASS INDEX: 21.01 KG/M2 | HEART RATE: 73 BPM | WEIGHT: 107 LBS

## 2019-07-30 DIAGNOSIS — G20 PARKINSON'S DISEASE (HCC): Primary | ICD-10-CM

## 2019-07-30 NOTE — PROGRESS NOTES
Date:  19     Name:  Elba Mccullough  :  1925  MRN:  [id number]     PCP:  Guerda Kelly MD    Chief Complaint   Patient presents with    Parkinsons Disease     HISTORY OF PRESENT ILLNESS: Follow up appointment for Parkinson disease. Symptoms:   Tremors/Shaking-still primarily left handed rest, it is not bothersome  Muscle stiffness (rigidity)-no only with the left hand  Problems with balance, shuffling walk, falls-some balance problems but she does not really move around very much. She sits and watches TV pretty much all day. Slowness (bradykinesia)  Freezing or wearing off, off time-she is getting some freezing and wearing off still but this seems to be related to an ongoing issue of not taking the Sinemet consistently. Daughter indicates that her mother would take it as prescribed if she were to bring it to her mother but she is not always home. Soft voice, issues swallowing-no  Sudden, erratic movements (Dyskinesias)-no  Memory loss, difficulty thinking or remembering clearly-she says it is good, daughter indicates that the memory is okay. She seems to remember what is important to her   Depression/anxiety-no  Difficulty sleeping, issues with talking in sleep or acting out dreams-no issues with sleeping but daughter indicates that she has talked in her sleep periodically  Constipation-yes  Hallucinations/delusions-no  Nausea-no  Lightheadedness, positional dizziness-no  Compulsive behaviors/urges-no  Morning [place]    Activity of daily living:  Does not cook or drive  Eating- no issues. Getting dressed, bathing, shaving, etc.-no  Housework/yardwork-daughter does this, but she likes to supervise  Rising from the bed or chair-she uses a lifting chair to help her stand. Current Outpatient Medications   Medication Sig    ELIQUIS 2.5 mg tablet Take 2.5 mg by mouth two (2) times a day.     cyanocobalamin (VITAMIN B-12) 1,000 mcg tablet Take 1,000 mcg by mouth daily.    docusate sodium (COLACE) 100 mg capsule Take 100 mg by mouth two (2) times a day.  iron,carb/vit C/vit B12/folic (IRON 940 PLUS PO) Take  by mouth.  carbidopa-levodopa (SINEMET)  mg per tablet TAKE 1 TABLET 3 TIMES A DAY AT 9AM, 1PM AND 5PM    lisinopril (PRINIVIL, ZESTRIL) 20 mg tablet     KLOR-CON M20 20 mEq tablet TAKE 1 TABLET BY MOUTH EVERY DAY WITH FOOD    furosemide (LASIX) 20 mg tablet TAKE 1 TABLET BY MOUTH DAILY    carboxymethylcellulose sodium (REFRESH OP) Apply  to eye.  TURMERIC ROOT EXTRACT PO Take  by mouth. No current facility-administered medications for this visit.       Allergies   Allergen Reactions    Shellfish Derived Nausea and Vomiting    Sulfa (Sulfonamide Antibiotics) Nausea and Vomiting     Past Medical History:   Diagnosis Date    Bell's palsy 1997    Diabetes ([facility]     Heart disease     mumur    High blood pressure     [facility] agitans ([facility])      Past Surgical History:   Procedure Laterality Date    ABDOMEN SURGERY PROC UNLISTED      carterized colon due to bleed    HX OTHER SURGICAL  Nov and Dec 2016    cataract removed in both eyes     HX PACEMAKER PLACEMENT  03/2018     Social History     Socioeconomic History    Marital status:      Spouse name: Not on file    Number of children: Not on file    Years of education: Not on file    Highest education level: Not on file   Occupational History    Not on file   Social Needs    Financial resource strain: Not on file    Food insecurity:     Worry: Not on file     Inability: Not on file    Transportation needs:     Medical: Not on file     Non-medical: Not on file   Tobacco Use    Smoking status: Never Smoker    Smokeless tobacco: Never Used   Substance and Sexual Activity    Alcohol use: No    Drug use: Not on file    Sexual activity: Not on file   Lifestyle    Physical activity:     Days per week: Not on file     Minutes per session: Not on file    Stress: Not on file Relationships    Social connections:     Talks on phone: Not on file     Gets together: Not on file     Attends [employer] service: Not on file     Active member of club or organization: Not on file     Attends meetings of clubs or organizations: Not on file     Relationship status: Not on file    Intimate partner violence:     Fear of current or ex partner: Not on file     Emotionally abused: Not on file     Physically abused: Not on file     Forced sexual activity: Not on file   Other Topics Concern    Not on file   Social History Narrative    Not on file     Family History   Problem Relation Age of Onset    Parkinsonism Sister     Parkinsonism Brother        PHYSICAL EXAMINATION:    Visit Vitals  /60   Pulse 73   Resp 20   Ht 5' (1.524 m)   Wt 48.5 kg (107 lb)   SpO2 98%   BMI 20.90 kg/m²     General: Well defined, nourished, and groomed individual in no acute distress.    Neck: Supple, nontender, no bruits, no pain with resistance to active range of motion.    Heart: Regular rate and rhythm, positive heart murmurs, no rub or gallop. Normal S1S2.    Lungs: Clear to auscultation bilaterally with equal chest expansion, no cough, no wheeze    Musculoskeletal: Extremities revealed had full range of motion of joints. There is some mild edema in the lower extremities bilaterally, left greater than right  Psych: Pleasant and engaging with a mask like face.    NEUROLOGICAL EXAMINATION:    Mental Status: Alert and oriented to person, place, and time with recent and remote memory intact. Attention span and concentration are normal. Speech is fluent with a full fund of knowledge.    Cranial Nerves:    II, III, IV, VI: Visual acuity grossly intact. Visual fields are normal.    Pupils are equal, round, and reactive to light and accommodation.    Extra-ocular movements are full and fluid. Fundoscopic exam was benign, no ptosis or nystagmus.    V-XII: Hearing is grossly intact.  There is left facial asymmetry secondary to Bell's Palsy. The palate rises symmetrically and the tongue protrudes midline. Sternocleidomastoids 5/5.    Motor Examination: Age related loss of tone and bulk. Strength testing with minimal generalized weakness. Minimal cogwheel rigidity of the left wrist    Coordination: Finger to nose and rapid arm movement testing demonstrate bradykinesia and mild dysmetria. No intention tremor. Left rest tremor, no chin tremor    Gait and Station: Mildly unsteady with a slow gait and decreased arm swing. Uses a cane for balance. Attenuation of the left hand rest tremor. No pronator drift. No muscle wasting or fasiculations noted.    Reflexes: DTRs 2+ throughout. ASSESSMENT AND PLAN    ICD-10-CM ICD-9-CM    1. [facility] ([facility] G20 332.0 REFERRAL TO HOME HEALTH     Parkinson's disease with decline some of which is secondary to being sedentary and generally unwilling to do much. Discussed improvements would likely be seen if she would be less sedentary and take her medication as prescribed. At least in the office, she has agreed to participate in home physical therapy. However, she is just as apt to change her mind once she leaves. Danielle Enriquez

## 2019-07-30 NOTE — PROGRESS NOTES
Parkinson's disease- she gets very tired sometimes   Her left hand gets stiff and uncomfortable sometimes   The tremors she only notices in her left hand

## 2019-08-01 ENCOUNTER — HOME CARE VISIT (OUTPATIENT)
Dept: SCHEDULING | Facility: HOME HEALTH | Age: 84
End: 2019-08-01

## 2019-09-05 DIAGNOSIS — G20 PARKINSON'S DISEASE (HCC): ICD-10-CM

## 2019-09-05 RX ORDER — CARBIDOPA AND LEVODOPA 25; 100 MG/1; MG/1
TABLET ORAL
Qty: 90 TAB | Refills: 3 | Status: SHIPPED | OUTPATIENT
Start: 2019-09-05 | End: 2019-12-03 | Stop reason: SDUPTHER

## 2019-12-03 DIAGNOSIS — G20 PARKINSON'S DISEASE (HCC): ICD-10-CM

## 2019-12-03 RX ORDER — CARBIDOPA AND LEVODOPA 25; 100 MG/1; MG/1
TABLET ORAL
Qty: 270 TAB | Refills: 1 | Status: SHIPPED | OUTPATIENT
Start: 2019-12-03 | End: 2020-01-28 | Stop reason: SDUPTHER

## 2020-01-28 ENCOUNTER — OFFICE VISIT (OUTPATIENT)
Dept: NEUROLOGY | Age: 85
End: 2020-01-28

## 2020-01-28 VITALS
SYSTOLIC BLOOD PRESSURE: 102 MMHG | OXYGEN SATURATION: 98 % | HEART RATE: 66 BPM | RESPIRATION RATE: 18 BRPM | BODY MASS INDEX: 20.8 KG/M2 | HEIGHT: 62 IN | DIASTOLIC BLOOD PRESSURE: 68 MMHG | WEIGHT: 113 LBS

## 2020-01-28 DIAGNOSIS — G20 PARKINSON'S DISEASE (HCC): Primary | ICD-10-CM

## 2020-01-28 RX ORDER — HYDRALAZINE HYDROCHLORIDE 25 MG/1
25 TABLET, FILM COATED ORAL 3 TIMES DAILY
COMMUNITY

## 2020-01-28 RX ORDER — CARBIDOPA AND LEVODOPA 25; 100 MG/1; MG/1
TABLET ORAL
Qty: 405 TAB | Refills: 3 | Status: SHIPPED | OUTPATIENT
Start: 2020-01-28 | End: 2020-06-29

## 2020-01-28 RX ORDER — BUMETANIDE 1 MG/1
TABLET ORAL DAILY
COMMUNITY

## 2020-01-28 RX ORDER — METOPROLOL TARTRATE 25 MG/1
TABLET, FILM COATED ORAL 2 TIMES DAILY
COMMUNITY

## 2020-01-28 NOTE — PROGRESS NOTES
Date:  20     Name:  Ronaldo Galloway  :  1925  MRN:  [id number]     PCP:  Declan Falk MD    Chief Complaint   Patient presents with    Parkinsons Disease     HISTORY OF PRESENT ILLNESS: Follow up appointment for Parkinson disease. Symptoms:   Tremors/Shaking-still primarily left handed rest, and a little worse  Muscle stiffness (rigidity)-no only with the left hand  Problems with balance, shuffling walk, falls-some balance problems but she does not really move around very much. She sits and watches TV pretty much all day. Daughter requests a new ultralight wheelchair to help with transportation  Slowness (bradykinesia) is about the same  Freezing or wearing off, off time-she is getting some freezing and wearing off. Soft voice, issues swallowing-no  Sudden, erratic movements (Dyskinesias)-no  Memory loss, difficulty thinking or remembering clearly-she says it is good, daughter indicates that the memory is okay. She seems to remember what is important to her which is also unchanged  Depression/anxiety-no  Difficulty sleeping, issues with talking in sleep or acting out dreams-no issues with sleeping but daughter indicates that she has talked in her sleep periodically  Constipation-yes  Hallucinations/delusions-no  Nausea-no  Lightheadedness, positional dizziness-no  Compulsive behaviors/urges-no  Morning [place]    Activity of daily living:  Does not cook or drive  Eating- no issues. Getting dressed, bathing, shaving, etc.-no  Housework/yardwork-daughter does this, but she likes to supervise  Rising from the bed or chair-she uses a lifting chair to help her stand. Current Outpatient Medications   Medication Sig    hydrALAZINE (APRESOLINE) 25 mg tablet Take 25 mg by mouth three (3) times daily.  bumetanide (BUMEX) 1 mg tablet Take  by mouth daily.  metoprolol tartrate (LOPRESSOR) 25 mg tablet Take  by mouth two (2) times a day.     carbidopa-levodopa (SINEMET)  mg per tablet TAKE 1 TABLET 3 TIMES A DAY AT 9AM, 1PM AND 5PM    ELIQUIS 2.5 mg tablet Take 2.5 mg by mouth two (2) times a day.  docusate sodium (COLACE) 100 mg capsule Take 100 mg by mouth two (2) times a day.  iron,carb/vit C/vit B12/folic (IRON 525 PLUS PO) Take  by mouth.  carboxymethylcellulose sodium (REFRESH OP) Apply  to eye. No current facility-administered medications for this visit.       Allergies   Allergen Reactions    Shellfish Derived Nausea and Vomiting    Sulfa (Sulfonamide Antibiotics) Nausea and Vomiting     Past Medical History:   Diagnosis Date    Bell's palsy 1997    Diabetes ([facility] [address] [facility]     Heart disease     mumur    High blood pressure     [facility]ans [facility])      Past Surgical History:   Procedure Laterality Date    ABDOMEN SURGERY PROC UNLISTED      carterized colon due to bleed    HX OTHER SURGICAL  Nov and Dec 2016    cataract removed in both eyes     HX PACEMAKER PLACEMENT  03/2018     Social History     Socioeconomic History    Marital status:      Spouse name: Not on file    Number of children: Not on file    Years of education: Not on file    Highest education level: Not on file   Occupational History    Not on file   Social Needs    Financial resource strain: Not on file    Food insecurity:     Worry: Not on file     Inability: Not on file    Transportation needs:     Medical: Not on file     Non-medical: Not on file   Tobacco Use    Smoking status: Never Smoker    Smokeless tobacco: Never Used   Substance and Sexual Activity    Alcohol use: No    Drug use: Not on file    Sexual activity: Not on file   Lifestyle    Physical activity:     Days per week: Not on file     Minutes per session: Not on file    Stress: Not on file   Relationships    Social connections:     Talks on phone: Not on file     Gets together: Not on file     Attends Sabianist service: Not on file     Active member of club or organization: Not on file Attends meetings of clubs or organizations: Not on file     Relationship status: Not on file    Intimate partner violence:     Fear of current or ex partner: Not on file     Emotionally abused: Not on file     Physically abused: Not on file     Forced sexual activity: Not on file   Other Topics Concern    Not on file   Social History Narrative    Not on file     Family History   Problem Relation Age of Onset    Parkinsonism Sister     Parkinsonism Brother        PHYSICAL EXAMINATION:    Visit Vitals  /68 (BP 1 Location: Left arm, BP Patient Position: Sitting)   Pulse 66   Resp 18   Ht 5' 2\" (1.575 m)   Wt 51.3 kg (113 lb)   SpO2 98%   BMI 20.67 kg/m²     General: Well defined, nourished, and groomed individual in no acute distress.    Neck: Supple, nontender, no bruits, no pain with resistance to active range of motion.    Heart: Regular rate and rhythm, positive heart murmurs, no rub or gallop. Normal S1S2.    Lungs: Clear to auscultation bilaterally with equal chest expansion, no cough, no wheeze    Musculoskeletal: Extremities revealed had full range of motion of joints. There is some mild edema in the lower extremities bilaterally, left greater than right  Psych: Pleasant and engaging with a mask like face.    NEUROLOGICAL EXAMINATION:    Mental Status: Alert and oriented to person, place, and time with recent and remote memory intact. Attention span and concentration are normal. Speech is fluent with a full fund of knowledge.    Cranial Nerves:    II, III, IV, VI: Visual acuity grossly intact. Visual fields are normal.    Pupils are equal, round, and reactive to light and accommodation.    Extra-ocular movements are full and fluid. Fundoscopic exam was benign, no ptosis or nystagmus.    V-XII: Hearing is grossly intact. There is left facial asymmetry secondary to Bell's Palsy. The palate rises symmetrically and the tongue protrudes midline.  Sternocleidomastoids 5/5.    Motor Examination: Age related loss of tone and bulk. Strength testing with minimal generalized weakness. Minimal cogwheel rigidity of the left wrist    Coordination: Finger to nose and rapid arm movement testing demonstrate bradykinesia and mild dysmetria. No intention tremor. Left rest tremor, no chin tremor    Gait and Station: Mildly unsteady with a slow gait and decreased arm swing. Uses a cane for balance. Attenuation of the left hand rest tremor. No pronator drift. No muscle wasting or fasiculations noted.    Reflexes: DTRs 2+ throughout. ASSESSMENT AND PLAN    ICD-10-CM ICD-9-CM    1. [facility] G20 332.0 Wheel Chair zohra      carbidopa-levodopa (SINEMET)  mg per tablet     Parkinson's disease with decline some of which is secondary to being sedentary and generally unwilling to do much. This is unchanged from her previous office visits. Otherwise, she is doing about the same. The tremor may be just a little bit more noticeable and there may be some increase in freezing or wearing off. We will increase his Sinemet to 1.5 tablets 3 times a day. An order for a ultralight wheelchair was also provided. Follow-up in 6 months or sooner if needed. Danielle June

## 2020-01-28 NOTE — PROGRESS NOTES
Patient daughter states that her parkinson's disease has been about the same no significant changes.

## 2020-06-26 DIAGNOSIS — G20 PARKINSON'S DISEASE (HCC): ICD-10-CM

## 2020-06-29 RX ORDER — CARBIDOPA AND LEVODOPA 25; 100 MG/1; MG/1
TABLET ORAL
Qty: 270 TAB | Refills: 1 | Status: SHIPPED | OUTPATIENT
Start: 2020-06-29 | End: 2021-03-19

## 2021-03-22 DIAGNOSIS — G20 PARKINSON'S DISEASE (HCC): ICD-10-CM

## 2021-03-22 RX ORDER — CARBIDOPA AND LEVODOPA 25; 100 MG/1; MG/1
TABLET ORAL
Qty: 270 TAB | Refills: 0 | Status: SHIPPED | OUTPATIENT
Start: 2021-03-22 | End: 2021-08-02

## 2021-09-03 ENCOUNTER — TELEPHONE (OUTPATIENT)
Dept: NEUROLOGY | Age: 86
End: 2021-09-03

## 2021-09-03 NOTE — TELEPHONE ENCOUNTER
----- Message from Maria Guadalupe Rivero sent at 9/3/2021 12:09 PM EDT -----  Regarding: RM Wise/telephone  General Message/Vendor Calls    Caller's first and last name:Elsie Mario      Reason for call:medication       Callback required yes/no and why:yes, due to medication      Best contact number(s):[phone]      Details to clarify the request:Caller wanted to know if she can increase her mothers  \"Cardidopa\" from 1.5 pills 3x/day to 2pills 3x/day      Maria Guadalupe Rivero

## 2021-09-07 NOTE — TELEPHONE ENCOUNTER
VM left for daughter to call office to schedule an appointment to be seen to discuss medication questions.

## 2021-09-07 NOTE — TELEPHONE ENCOUNTER
VM letting daughter know Juliocesar Murrell will be back in the office tomorrow  to review her messages.

## 2021-09-08 ENCOUNTER — TELEPHONE (OUTPATIENT)
Dept: NEUROLOGY | Age: 86
End: 2021-09-08

## 2021-09-08 NOTE — TELEPHONE ENCOUNTER
----- Message from Juliette Bains sent at 9/7/2021  4:31 PM EDT -----  Regarding: RM Wise/ Telephone  Caller's first and last name: Manuel Bond, daughter    Reason for call: VV request     Callback required yes/no and why: Yes, to discuss VV     Best contact number(s): [phone]    Details to clarify the request: Pt's daughter was advised to schedule an appt to discuss her mother's medication change, but pt's daughter is stating that it is incredibly difficult to have pt leave the house. Pt's daughter is requesting a VV via email or a phone consultation visit instead of an in person visit.

## 2021-10-15 ENCOUNTER — TELEPHONE (OUTPATIENT)
Dept: NEUROLOGY | Age: 86
End: 2021-10-15

## 2021-10-15 NOTE — TELEPHONE ENCOUNTER
----- Message from Flor Sagastume sent at 10/15/2021  1:44 PM EDT -----  Regarding: Md Wise/Telephone  General Message/Vendor Calls    Caller's first and last name: Janette cornejo       Reason for call: Increase dose of carbidopa 1/2 pill 3 times a day.        Callback required yes/no and why: yes  , stated above       Best contact number(s): [phone]      Details to clarify the request: n/a       Flor Sagastume

## 2021-10-15 NOTE — TELEPHONE ENCOUNTER
Returned her call. She stated the patient is in a home now and has increased delusions she would like to increase her medication from 1.5 3x/day to taking 2 tablets 3x/day. She stated the home doesn't want her to increase her medication but she doesn't think she is getting the full affect of the medication. She knows you are not here today, she does have a visit scheduled next Tuesday so she said it was ok if it needs to be discussed on Tuesday. She stated the home doesn't listen to her about crushing her meds.   Please advise

## 2021-10-19 NOTE — TELEPHONE ENCOUNTER
Pt last seen 1/28/2020. Daughter advised of Danielle's suggestions. Also offered to schedule pt for an appointment and daughter states she will call back to do so.

## 2021-10-19 NOTE — TELEPHONE ENCOUNTER
Spoke with the patient's daughter to check the patient in. The patient is at the [address] [facility] so she can't do the virtual appontment (10/20)  with her daughter. I told her I'm canceling it and will send a message instead she has concerns about her medication Carbidopa. The PHI is  but her daughter has POA she has to get the forms to us. She'd like a call back.

## 2021-10-23 DIAGNOSIS — G20 PARKINSON'S DISEASE (HCC): ICD-10-CM

## 2021-10-24 RX ORDER — CARBIDOPA AND LEVODOPA 25; 100 MG/1; MG/1
TABLET ORAL
Qty: 270 TABLET | Refills: 0 | Status: SHIPPED | OUTPATIENT
Start: 2021-10-24

## 2022-09-01 NOTE — PROGRESS NOTES
She has been doing okay since her last visit   No changes that she needs to report at this time NEGATIVE